# Patient Record
Sex: MALE | Race: WHITE | Employment: FULL TIME | URBAN - METROPOLITAN AREA
[De-identification: names, ages, dates, MRNs, and addresses within clinical notes are randomized per-mention and may not be internally consistent; named-entity substitution may affect disease eponyms.]

---

## 2017-03-03 NOTE — TELEPHONE ENCOUNTER
From: Fermin Guaman  To: Jair Valenzuela MD  Sent: 3/2/2017 10:52 PM EST  Subject: Medication Renewal Request    Original authorizing provider: MD Fermin Anderson would like a refill of the following medications:  zolpidem (AMBIEN) 10 mg tablet Jair Valenzuela MD]    Preferred pharmacy: [facility]/PHARMACY #[id number]- [facility] [address]    Comment:  Adriano Quarles, please send refill information to [address] [place] [address] for Zolpidem 10mg # 90 with 2 refills.  thanks [facility]

## 2017-03-05 RX ORDER — ZOLPIDEM TARTRATE 10 MG/1
TABLET ORAL
Qty: 30 TAB | Refills: 3 | Status: SHIPPED | OUTPATIENT
Start: 2017-03-05

## 2017-03-05 RX ORDER — ZOLPIDEM TARTRATE 10 MG/1
10 TABLET ORAL
Qty: 90 TAB | Refills: 2 | Status: SHIPPED | OUTPATIENT
Start: 2017-03-05 | End: 2018-08-13 | Stop reason: SDUPTHER

## 2017-03-06 RX ORDER — ZOLPIDEM TARTRATE 10 MG/1
10 TABLET ORAL
Qty: 90 TAB | Refills: 2 | Status: SHIPPED | OUTPATIENT
Start: 2017-04-20 | End: 2017-08-03 | Stop reason: SDUPTHER

## 2017-03-06 NOTE — TELEPHONE ENCOUNTER
From: Vinson Dandy  To: Toni Denver, MD  Sent: 3/5/2017 2:33 PM EST  Subject: Medication Renewal Request    Original authorizing provider: Toni Denver, MD Vinson Dandy would like a refill of the following medications:  zolpidem (AMBIEN) 10 mg tablet Toni Denver, MD]    Preferred pharmacy: [facility]/PHARMACY #[id number] [facility] [address]    Comment:  Jeni Clifton, please authorize refill for Ambien 10mg #90 2 refills.  thanks

## 2017-03-08 ENCOUNTER — HOSPITAL ENCOUNTER (OUTPATIENT)
Dept: PHYSICAL THERAPY | Age: 62
Discharge: HOME | End: 2017-03-08
Payer: COMMERCIAL

## 2017-03-08 PROCEDURE — 97012 MECHANICAL TRACTION THERAPY: CPT

## 2017-03-08 PROCEDURE — 97162 PT EVAL MOD COMPLEX 30 MIN: CPT

## 2017-03-08 NOTE — PROGRESS NOTES
PT CERVICAL EVAL AND TREATMENT     Patient Name: Philipp Mcdonald  Date:3/8/2017  : 1955  [x]  Patient  Verified  Payor: BLUE CROSS / Plan: Verito Jarrett [id number] PPO / Product Type: PPO /    In time:500  Out time:600  Total Treatment Time (min): 60   Visit #: 1 of 12    Treatment Area: Cervicalgia [M54.2]    Physical Therapy Evaluation Cervical Spine - [facility]  SUBJECTIVE  Chief Complaint: Pt is 63 y.o. male presenting with R shoulder pain with occasional numbness. Pt reports symptoms began 6 weeks ago. Pain ranges 2-9/10; better with medication. Pt reports no previous injury to the affected area in the past. 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 Pt reports numbness or tingling in the R hand into the fingers. Pt also reports currently having partial RTC tear in the R shoulder. Pt presents with the following functional limitations: decreased sleeping tolerance, decreased recreation activities such as golfing, decreased reaching ability. .       Mechanism of injury: stretching    Symptoms  Aggravated by:   [] Bending [] Sitting [] Standing [] Reaching Overhead   [] Moving [] Cough [] Sneeze [] Eating   [] AM  [] PM  Lying:  [] sup   [] pro   [] sidelying   [] Other:     Eased by:    [] Bending [] Sitting [] Standing Lying: [] sup  [] pro  [] sidelying   [] Moving [] AM  [] PM  [] Other:     General Health:  Red Flags Indicated? [] Yes    [x] No  [] Yes [] No Recent weight change (If yes, due to dieting?  [] Yes  [] No)   [] Yes [] No Persistent cough  [] Yes [] No Unremitting pain at night  [] Yes [] No Dizziness  [] Yes [] No Blurred vision  [] Yes [] No Hands more cold or painful in cold weather  [] Yes [] No Ringing in ears  [] Yes [] No Difficulty swallowing  [] Yes [] No Dysfunction of bowel or bladder  [] Yes [] No Recent illness within past 3 weeks (i.e, cold, flu)  [] Yes [] No Jaw pain      Diagnostic Tests: [] Lab work [] X-rays    [] CT [x] MRI     [] Other:  Results:     Functional Status  Prior level of function:  Present functional limitations:  What position do you sleep in?: supine      Headaches: Do you have headaches? [] Yes   [x] No  How often do you get headaches? How long does the headache last?  What aggravates it? What relieves it? Does the headache coincide with any other symptoms (visual disturbances, light sensitivity)? Where is the headache? Does it change locations?   Other:    OBJECTIVE  Posture: [] WNL  Head Position:  Shoulder/Scapular Position: FHRS posture  C-Kyphosis:  [] increased   [] decreased   C-Lordosis:   [] increased   [x] decreased  T-Kyphosis:  [] increased   [] decreased  T-Lordosis:   [] increased   [] decreased       Cervical Retraction: [] WNL    [x] Abnormal:    Shoulder/Scapular Screen: [] WNL    [] Abnormal:    Active Movements: [] N/A   [] Too acute   [] Other:  ROM % AROM % PROM Comments:pain, area   Forward flexion 30 p     Extension 40     SB right 30 p     SB left  40     Rotation right 45     Rotation left 45       Thoracic Spine: [] N/A    [] WNL   [] Other:      Palpation:  [] Min  [x] Mod  [] Severe    Location: TTP R UT, R C/S paraspinals  [] Min  [] Mod  [] Severe    Location:  [] Min  [] Mod  [] Severe    Location:    Neuro Screen (myotome/dematome/felexes): [x] WNL  Myotome Level Muscle Test Myotome Level Muscle Test   C5 Shoulder Adduction - Deltoid C8 Finger Flexors   C6 Wrist Extension T1 Finger Abduction - Interossei   C7 Elbow Extension     Comments:  Upper Limb Tension Tests: [] N/A       Ulnar: [] R    [] L    [] +    [x] -       Median: [] R    [] L    [] +    [x] -       Radial: [] R    [] L    [] +    [x] -    Special Tests:  Cervical:        Vertebral Artery:  [] R    [] L    [] +    [] -       Alar Ligament: [] R    [] L    [] +    [] -       Transverse Lig: [] R    [] L    [] +    [] -       Spurling's:  [x] R    [] L    [x] +    [] -       Distraction:  [x] R    [] L    [x] +    [] -       Compression: [] R    [] L    [] +    [] -    Thoracic Outlet Tests: [] N/A       Adson's:  [] R    [] L    [] +    [] -       Hyperabduction: [] R    [] L    [] +    [] -       Scott's:  [] R    [] L    [] +    [] -       Parisa:  [] R    [] L    [] +    [] -    Diaphragmatic Breathing: [] Normal    [] Abnormal    Muscle Flexibility: [] N/A   Scalenes: [] WNL    [x] Tight    [x] R    [] L   Upper Trap: [] WNL    [x] Tight    [x] R    [] L   Levator: [] WNL    [x] Tight    [x] R    [] L   Pect. Minor: [] WNL    [x] Tight    [x] R    [] L    Global Muscular Weakness: [] N/A   Lower Trap:   Rhomboids:   Middle Trap:   Serratus Ant:   Ext Rotators: Other:    Other tests/comments:  Modality (rationale): decrease radicular symptoms in the R UE  []  E-Stim: type _ x _ min     []att   []unatt   []w/US   []w/ice   []w/heat  [x]  Traction: [x]cerv   []pelvic   12 lbs x 10 min     []pro   [x]sup   [x]int   []const  []  Ultrasound: []cont   []pulse    _ W/cm2 x _  min   []1MHz   []3MHz  []  Iontophoresis: []take home patch w/ dexamethazone    []40mA   []80mA                               []_ mA min w/: []dexamethazone   []other:_  []  Ice pack _  min     [] Hot pack _  min     [] Paraffin _  min  []  Other:    Patient Education: [x] Established HEP    [] POT  (minutes) : 15    Pain Level (0-10 scale) post treatment: 2    ASSESSMENT  [x]  See Plan of Care    Evaluation Code Complexity: History MEDIUM  Complexity : 1-2 comorbidities / personal factors will impact the outcome/ POC ; Examination HIGH Complexity : 4+ Standardized tests and measures addressing body structure, function, activity limitation and / or participation in recreation ; Presentation MEDIUM Complexity : Evolving with changing characteristics ; Decision Making MEDIUM Complexity : FOTO score of 26-74;  Complexity MEDIUM    Justification for Eval Code Complexity:  Patient History : tobacco use  Examination SEE ABOVE EXAM  Clinical Presentation: evolving pain  Clinical Decision Making : UWIK 32      SEXS  [x]  Upgrade activities as tolerated     []  Continue plan of care  []  Discharge due to:_  [x] Other:_POC  2-3x/week for 4 weeks    Gilberto Gonzales, PT 3/8/2017  5:03 PM

## 2017-03-08 NOTE — PROGRESS NOTES
[address] [facility] AT [address], [address] [place] [address] Road  Phone: [phone]   [phone]  PLAN OF CARE / [address] [place] [address] PHYSICAL THERAPY SERVICES  Patient Name: Anju Case : 1955   Medical   Diagnosis: Cervicalgia [M54.2] Treatment Diagnosis: Cervicalgia [M54.2]   Onset Date: 6 weeks ago     Referral Source: Yaz Villa MD Start of [facility]): 3/8/2017   Prior Hospitalization: See medical history Provider #: [id number]   Prior Level of Function: Independent and pain free with ADL's   Comorbidities: None Listed   Medications: Verified on Patient Summary List   The Plan of Care and following information is based on the information from the initial evaluation.   ===========================================================================================  Assessment / key information:  Pt is 63 y.o. male presenting with R shoulder pain with occasional numbness. Pt reports symptoms began 6 weeks ago. Pain ranges 2-9/10; better with medication. Pt reports no previous injury to the affected area in the past. 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 Pt reports numbness or tingling in the R hand into the fingers. Pt also reports currently having partial RTC tear in the R shoulder. Pt presents with the following functional limitations: decreased sleeping tolerance, decreased recreation activities such as golfing, decreased reaching ability. Pt presented with FHRS posture in sitting. TTP over the R UT, C/S paraspinals. Decreased C/S AROM with pain. Decreased gross UE strength 4-/5 in all directions. Symmetrical  strength at this time. + Spurlings, + Distraction test. Sensation intact to light touch BL UE's. Neural tension tests negative at this time. Moderate tension noted in the R scalenes, UT, levator, and pec minor.  The patient was instructed in a home exercise program to address the above findings/deficits. Pt will benefit from PT interventions to address the aforementioned deficits and allow pt to return to PLOF.    ===========================================================================================  History MEDIUM  Complexity : 1-2 comorbidities / personal factors will impact the outcome/ POC ; Examination HIGH Complexity : 4+ Standardized tests and measures addressing body structure, function, activity limitation and / or participation in recreation ; Presentation MEDIUM Complexity : Evolving with changing characteristics ; Decision Making MEDIUM Complexity : FOTO score of 26-74; Complexity MEDIUM  Problem List: pain affecting function, decrease ROM, decrease strength, decrease ADL/ functional abilitiies, decrease activity tolerance, decrease flexibility/ joint mobility and decrease transfer abilities   Treatment Plan may include any combination of the following: Therapeutic exercise, Therapeutic activities, Neuromuscular re-education, Physical agent/modality, Manual therapy, Patient education and Functional mobility training  Patient / Family readiness to learn indicated by: asking questions, trying to perform skills and interest  Persons(s) to be included in education: patient (P)  Barriers to Learning/Limitations: None  Measures taken:    Patient Goal (s): Get rid of the pain   Patient self reported health status: excellent  Rehabilitation Potential: good   Short Term Goals: To be accomplished in  1-2  weeks:  1. Pt to demonstrate independence with HEP to improve c/s AROM and posture for ADLs. 2. Pt to demonstrate improved posture >50% of time in PT to improve axial stability for ADLs.  Long Term Goals: To be accomplished in  8-12  treatments:  1. Pt to report +5 or > on GROC to improve functional mobility for ADLs. Improve FOTO outcome score by 10-15% in order to indicate a significant improvement in ADL function.   3. Pt to demonstrate WNL C/S AROM to improve mobility for ADLs. 4. Pt will report 75% overall improvement with sleeping tolerance in order to progress back to PLOF. Frequency / Duration:   Patient to be seen  2-3  times per week for 8-12  treatments:  Patient / Caregiver education and instruction: exercises  G[employer]Papi (GP): PIERRE  Therapist Signature: Keaton Gonzales, PT Date: 3/0/0719   Certification Period: NA Time: 5:39 PM   ===========================================================================================  I certify that the above Physical Therapy Services are being furnished while the patient is under my care. I agree with the treatment plan and certify that this therapy is necessary. Physician Signature:        Date:       Time:     Please sign and return to [facility] [place] [facility] or you may fax the signed copy to [phone]. Thank you.

## 2017-03-09 ENCOUNTER — HOSPITAL ENCOUNTER (OUTPATIENT)
Dept: PHYSICAL THERAPY | Age: 62
Discharge: HOME | End: 2017-03-09
Payer: COMMERCIAL

## 2017-03-09 PROCEDURE — 97012 MECHANICAL TRACTION THERAPY: CPT

## 2017-03-09 PROCEDURE — 97110 THERAPEUTIC EXERCISES: CPT

## 2017-03-09 NOTE — PROGRESS NOTES
PT DAILY TREATMENT NOTE 8    Patient Name: Virgie Space  Date:3/9/2017  : 1955  [x]  Patient  Verified  Payor: BLUE CROSS / Plan: Verito Jarrett [id number] PPO / Product Type: PPO /    In time:1000  Out time:1100  Total Treatment Time (min): 60  Visit #: 2 of 12    Treatment Area: Cervicalgia [M54.2]    SUBJECTIVE  Pain Level (0-10 scale): 3  Any medication changes, allergies to medications, adverse drug reactions, diagnosis change, or new procedure performed?: [x] No    [] Yes (see summary sheet for update)  Subjective functional status/changes:   [] No changes reported  Pt reports mild soreness since his IE yestersay.     OBJECTIVE  Modality rationale: decrease pain and increase tissue extensibility to improve the patients ability to perform functional ADL's   Min Type Additional Details    [] Estim: []Att   []Unatt        []TENS instruct                  []IFC  []Premod   []NMES                     []Other:  []w/US   []w/ice   []w/heat  Position:  Location:   15 [x]  Traction: [x] Cervical       []Lumbar                       [] Prone          []Supine                       [x]Intermittent   []Continuous Lbs: 12/5  [] before manual  [] after manual    []  Ultrasound: []Continuous   [] Pulsed                           []1MHz   []3MHz Location:  W/cm2:    []  Iontophoresis with dexamethasone         Location: [] Take home patch   [] In clinic   10 []  Ice     [x]  heat  []  Ice massage Position:semreclined  Location: C/S    []  Vasopneumatic Device Pressure:       [] lo [] med [] hi   Temperature: [] lo [] med [] hi   [x] Skin assessment post-treatment:  [x]intact []redness- no adverse reaction       []redness  adverse reaction:       35 min Therapeutic Exercise:  [x] See flow sheet :   Rationale: increase ROM and increase strength to improve the patients ability to perform functional ADL's            X min Patient Education: [x] Review HEP    [] Progressed/Changed HEP based on:   [] positioning [] body mechanics   [] transfers   [] heat/ice application        Other Objective/Functional Measures: Initiated therex today per flow sheet, requiring vc and demo 100% of the time for proper form and technique. Pain Level (0-10 scale) post treatment: 1    ASSESSMENT/Changes in Function: Pt was able to tolerate initiation of therex with moderate reports of pain and soreness. Moderate soreness and increased tingling noted with cervicothoracic stabs as well as tband rows and pulldowns. Will continues to address postural abnormalities as well as pursue home traction unit for the patient. Patient will continue to benefit from skilled PT services to modify and progress therapeutic interventions, address functional mobility deficits, address ROM deficits, address strength deficits, analyze and address soft tissue restrictions, analyze and cue movement patterns, analyze and modify body mechanics/ergonomics and assess and modify postural abnormalities to attain remaining goals. [x]  See Plan of Care  []  See progress note/recertification  []  See Discharge Summary         Progress towards goals / Updated goals:  All goals reviewed and progressing appropriately as of 3/9/2017     PLAN  [x]  Upgrade activities as tolerated     [x]  Continue plan of care  []  Update interventions per flow sheet       []  Discharge due to:_  []  Other:_      Pradeep Gonzales PT 3/9/2017  9:04 AM    Future Appointments  Date Time Provider Damaris Wolfe   3/9/2017 10:00 AM Pradeep Gonzales PT [facility]

## 2017-03-17 ENCOUNTER — HOSPITAL ENCOUNTER (OUTPATIENT)
Dept: PHYSICAL THERAPY | Age: 62
Discharge: HOME | End: 2017-03-17
Payer: COMMERCIAL

## 2017-03-17 PROCEDURE — 97012 MECHANICAL TRACTION THERAPY: CPT

## 2017-03-17 PROCEDURE — 97140 MANUAL THERAPY 1/> REGIONS: CPT

## 2017-03-17 PROCEDURE — 97110 THERAPEUTIC EXERCISES: CPT

## 2017-03-17 NOTE — PROGRESS NOTES
PT DAILY TREATMENT NOTE     Patient Name: Connie Rolle  Date:3/17/2017  : 1955  [x]  Patient  Verified  Payor: BLUE CROSS / Plan: [place] [facility] [employer] PPO / Product Type: PPO /    In time:800  Out time:845  Total Treatment Time (min): 45  Visit #: 3 of 12    Treatment Area: Cervicalgia [M54.2]    SUBJECTIVE  Pain Level (0-10 scale): 0  Any medication changes, allergies to medications, adverse drug reactions, diagnosis change, or new procedure performed?: [x] No    [] Yes (see summary sheet for update)  Subjective functional status/changes:   [] No changes reported  \"I was not able to do my exercises much while I was out of town, and I felt it more\"    OBJECTIVE  Modality rationale: decrease pain and increase tissue extensibility to improve the patients ability to perform functional ADL's   Min Type Additional Details    [] Estim: []Att   []Unatt        []TENS instruct                  []IFC  []Premod   []NMES                     []Other:  []w/US   []w/ice   []w/heat  Position:  Location:   15 [x]  Traction: [x] Cervical       []Lumbar                       [] Prone          []Supine                       [x]Intermittent   []Continuous Lbs: 12/5  [] before manual  [] after manual    []  Ultrasound: []Continuous   [] Pulsed                           []1MHz   []3MHz Location:  W/cm2:    []  Iontophoresis with dexamethasone         Location: [] Take home patch   [] In clinic   10 []  Ice     [x]  heat  []  Ice massage Position:semreclined  Location: C/S    []  Vasopneumatic Device Pressure:       [] lo [] med [] hi   Temperature: [] lo [] med [] hi   [x] Skin assessment post-treatment:  [x]intact []redness- no adverse reaction       []redness  adverse reaction:       12 min Therapeutic Exercise:  [x] See flow sheet :   Rationale: increase ROM and increase strength to improve the patients ability to perform functional ADL's      8 min Manual Therapy:  DTM to C/S paraspinals and UT.     Rationale: increase ROM  to improve the patients ability to perform functional ADL's and improve C/S mobility            X min Patient Education: [x] Review HEP    [] Progressed/Changed HEP based on:   [] positioning   [] body mechanics   [] transfers   [] heat/ice application        Other Objective/Functional Measures:    Pain Level (0-10 scale) post treatment: 2    ASSESSMENT/Changes in Function: Pt had moderate difficulty with any UE movements today due to RTC pain. Pt continues to have the most benefit from the traction with radicular symptoms decreasing following. Will continue to progress therex within current POC as patient is able. Will continue to attempt MD contact about receiving home traction request.     Patient will continue to benefit from skilled PT services to modify and progress therapeutic interventions, address functional mobility deficits, address ROM deficits, address strength deficits, analyze and address soft tissue restrictions, analyze and cue movement patterns, analyze and modify body mechanics/ergonomics and assess and modify postural abnormalities to attain remaining goals. [x]  See Plan of Care  []  See progress note/recertification  []  See Discharge Summary         Progress towards goals / Updated goals:  All goals reviewed and progressing appropriately as of 3/17/2017     PLAN  [x]  Upgrade activities as tolerated     [x]  Continue plan of care  []  Update interventions per flow sheet       []  Discharge due to:_  []  Other:_      Christina Gonzales PT 3/17/2017  9:04 AM    Future Appointments  Date Time Provider Damaris Wolfe   3/17/2017 8:00 AM Christina Gonzales PT [facility]

## 2017-03-24 ENCOUNTER — HOSPITAL ENCOUNTER (OUTPATIENT)
Dept: PHYSICAL THERAPY | Age: 62
Discharge: HOME | End: 2017-03-24
Payer: COMMERCIAL

## 2017-03-24 PROCEDURE — 97012 MECHANICAL TRACTION THERAPY: CPT

## 2017-03-24 PROCEDURE — 97110 THERAPEUTIC EXERCISES: CPT

## 2017-03-24 NOTE — PROGRESS NOTES
PT DAILY TREATMENT NOTE     Patient Name: Damian Landon  Date:3/24/2017  : 1955  [x]  Patient  Verified  Payor: BLUE CROSS / Plan: [facility] PPO / Product Type: PPO /    In time:8:04  Out time:8:48  Total Treatment Time (min): 44  Total Timed Codes (min): 44  1:1 Treatment Time (min):    Visit #: 4 of 12    Treatment Area: Cervicalgia [M54.2]    SUBJECTIVE  Pain Level (0-10 scale): 0  Any medication changes, allergies to medications, adverse drug reactions, diagnosis change, or new procedure performed?: [x] No    [] Yes (see summary sheet for update)  Subjective functional status/changes:   [] No changes reported  Pt reports no pain this morning, good adherence to his home exercises and overall reduction in intensity/frequency of arm numbness. He still does get the symptoms, but the traction also seems to help.     OBJECTIVE  Modality rationale: increase tissue extensibility to improve the patients ability to change and maintain head and shoulder positions     Min Type Additional Details    [] Estim: []Att   []Unatt        []TENS instruct                  []IFC  []Premod   []NMES                     []Other:  []w/US   []w/ice   []w/heat  Position:  Location:   15 [x]  Traction: [x] Cervical       []Lumbar                       [] Prone          [x]Supine                       [x]Intermittent   []Continuous Lbs:20/14  [] before manual  [] after manual    []  Ultrasound: []Continuous   [] Pulsed                           []1MHz   []3MHz Location:  W/cm2:    []  Iontophoresis with dexamethasone         Location: [] Take home patch   [] In clinic    []  Ice     []  heat  []  Ice massage Position:  Location:    []  Vasopneumatic Device Pressure:       [] lo [] med [] hi   Temperature: [] lo [] med [] hi   [] Skin assessment post-treatment:  []intact []redness- no adverse reaction       []redness  adverse reaction:       29 min Therapeutic Exercise:  [] See flow sheet :   Rationale: increase ROM and increase strength to improve the patients ability to change and maintain head and shoulder positions  Included neural tension assessment, foam roll and 1/2 foam roll trial stretch    Pain Level (0-10 scale) post treatment: 0    ASSESSMENT/Changes in Function: Pt also assessed and found to have increased/adverse right UE neural tension compared to left, including tingling. Pt was given 2 options of corkscrew stretch or foam roll postural stretch to be done to tolerance to address this issue in addition to existing cervical HEP. L cervical AROM still limited    Patient will continue to benefit from skilled PT services to modify and progress therapeutic interventions, address functional mobility deficits and analyze and cue movement patterns to attain remaining goals. []  See Plan of Care  []  See progress note/recertification  []  See Discharge Summary       LTG 3: Pt to demonstrate WNL C/S AROM to improve mobility for ADLs. - Not met with flexion, L rotation    PLAN  []  Upgrade activities as tolerated     [x]  Continue plan of care  []  Update interventions per flow sheet       []  Discharge due to:_  []  Other:_      Francis Carrizales, PT 3/24/2017  7:59 AM

## 2017-03-31 ENCOUNTER — HOSPITAL ENCOUNTER (OUTPATIENT)
Dept: PHYSICAL THERAPY | Age: 62
Discharge: HOME | End: 2017-03-31
Payer: COMMERCIAL

## 2017-03-31 PROCEDURE — 97110 THERAPEUTIC EXERCISES: CPT

## 2017-03-31 NOTE — PROGRESS NOTES
PT DAILY TREATMENT NOTE     Patient Name: Luh Torres  Date:3/31/2017  : 1955  [x]  Patient  Verified  Payor: BLUE CROSS / Plan: [facility] PPO / Product Type: PPO /    In time:800  Out time:828  Total Treatment Time (min): 28  Visit #: 5 of 12    Treatment Area: Cervicalgia [M54.2]    SUBJECTIVE  Pain Level (0-10 scale): 0  Any medication changes, allergies to medications, adverse drug reactions, diagnosis change, or new procedure performed?: [x] No    [] Yes (see summary sheet for update)  Subjective functional status/changes:   [] No changes reported  SEE DC SUMMARY    OBJECTIVE  Modality rationale: increase tissue extensibility to improve the patients ability to change and maintain head and shoulder positions     Min Type Additional Details    [] Estim: []Att   []Unatt        []TENS instruct                  []IFC  []Premod   []NMES                     []Other:  []w/US   []w/ice   []w/heat  Position:  Location:   ND [x]  Traction: [x] Cervical       []Lumbar                       [] Prone          [x]Supine                       [x]Intermittent   []Continuous Lbs:20/14  [] before manual  [] after manual    []  Ultrasound: []Continuous   [] Pulsed                           []1MHz   []3MHz Location:  W/cm2:    []  Iontophoresis with dexamethasone         Location: [] Take home patch   [] In clinic    []  Ice     []  heat  []  Ice massage Position:  Location:    []  Vasopneumatic Device Pressure:       [] lo [] med [] hi   Temperature: [] lo [] med [] hi   [] Skin assessment post-treatment:  []intact []redness- no adverse reaction       []redness  adverse reaction:       28 min Manual Therapy:  Reassessment for PN, demonstration and education on traction unit from rep   Rationale: prepare patient from DC to long term HEP    Pain Level (0-10 scale) post treatment: 0    ASSESSMENT/Changes in Function:    []  See Plan of Care  []  See progress note/recertification  [x]  See Discharge Summary        PLAN  []  Upgrade activities as tolerated     []  Continue plan of care  []  Update interventions per flow sheet       [x]  Discharge due to: pt will DC to long term HEP and use of cervical traction unit  []  Other:_      Ita Gonzales PT 3/31/2017  7:59 AM

## 2017-03-31 NOTE — PROGRESS NOTES
[facility] AT [address], [address] [place] [address]  Phone: [phone]   [phone]  DISCHARGE SUMMARY  Patient Name: Jorge Norton :    Treatment/Medical Diagnosis: Cervicalgia [M54.2]   Referral Source: Nancy Maldonado MD     Date of Initial Visit: 3/8/17 Attended Visits: 5 Missed Visits: 0     SUMMARY OF TREATMENT  Pt was seen on 3/8/17 for an initial evaluation for Cervicalgia. Pt has been seen for 5 visits and therapy has included: therapeutic exercise, manual therapy, modalities for pain control, patient education and HEP. CURRENT STATUS  Pt has been seen for 5 visits since IE on 3/8/17. Pt reports 75% overall improvement since beginning therapy. Pt has received home traction unit as well as long term HEP and wished to continue with therapy at home at this time. Pt has improved all C/S AROM at this time with moderate improvements in pain. Pt has received a home traction unit and will continue to use as needed at home. 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 Pt has progressed well and will be DC to long term HEP at this time. C/S AROM: 45 flexion, 50 ext, 65 Rot, 45 SB    Short Term Goals: To be accomplished in 1-2 weeks:  1. Pt to demonstrate independence with HEP to improve c/s AROM and posture for ADLs. - goal met  2. Pt to demonstrate improved posture >50% of time in PT to improve axial stability for ADLs. - goal met     Long Term Goals: To be accomplished in 8-12 treatments:  1. Pt to report +5 or > on GROC to improve functional mobility for ADLs- NA  2. Improve FOTO outcome score by 10-15% in order to indicate a significant improvement in ADL function. - 66%, goal met  3. Pt to demonstrate WNL C/S AROM to improve mobility for ADLs- goal met  4.  Pt will report 75% overall improvement with sleeping tolerance in order to progress back to PLOF- goal met      RECOMMENDATIONS  Other: Patient will continue at home with HEP and use of home traction unit. If you have any questions/comments please contact us directly at [phone]. Thank you for allowing us to assist in the care of your patient.     Therapist Signature: Heber Anne PT Date: 3/31/2017   Reporting Period: NA Time: 6:59 AM

## 2017-06-12 NOTE — TELEPHONE ENCOUNTER
From: Booker Mcclain  To: Chuy Wesley MD  Sent: 6/11/2017 11:30 AM EDT  Subject: Medication Renewal Request    Original authorizing provider: MD Booker Machado would like a refill of the following medications:  tamsulosin (FLOMAX) 0.4 mg capsule Chuy Wesley MD]    Preferred pharmacy: [facility]PHARMACY #[id number]- [place] [address]    Comment:

## 2017-06-13 RX ORDER — TAMSULOSIN HYDROCHLORIDE 0.4 MG/1
0.4 CAPSULE ORAL DAILY
Qty: 90 CAP | Refills: 3 | Status: SHIPPED | OUTPATIENT
Start: 2017-06-13 | End: 2018-05-29 | Stop reason: SDUPTHER

## 2017-08-07 RX ORDER — ZOLPIDEM TARTRATE 10 MG/1
10 TABLET ORAL
Qty: 90 TAB | Refills: 2 | Status: SHIPPED | OUTPATIENT
Start: 2017-08-29

## 2017-08-07 NOTE — TELEPHONE ENCOUNTER
From: Gilberto Mayers  To: Love Cason MD  Sent: 8/3/2017 3:52 PM EDT  Subject: Medication Renewal Request    Original authorizing provider: MD Gilberto Aggarwal would like a refill of the following medications:  zolpidem (AMBIEN) 10 mg tablet Love Cason MD]    Preferred pharmacy: Other - [facility] in [place] [phone]    Comment:  Please authorize Zolpidem 10mg #90 with to refills.  [facility] in [place] [phone]

## 2017-08-09 RX ORDER — ZOLPIDEM TARTRATE 10 MG/1
TABLET ORAL
Qty: 90 TAB | Refills: 0 | Status: SHIPPED | OUTPATIENT
Start: 2017-08-09 | End: 2018-03-31 | Stop reason: SDUPTHER

## 2018-03-26 ENCOUNTER — HOSPITAL ENCOUNTER (OUTPATIENT)
Dept: PHYSICAL THERAPY | Age: 63
Discharge: HOME | End: 2018-03-26
Payer: COMMERCIAL

## 2018-03-26 PROCEDURE — 97162 PT EVAL MOD COMPLEX 30 MIN: CPT

## 2018-03-26 PROCEDURE — 97012 MECHANICAL TRACTION THERAPY: CPT

## 2018-03-26 PROCEDURE — 97110 THERAPEUTIC EXERCISES: CPT

## 2018-03-26 NOTE — PROGRESS NOTES
PT DAILY TREATMENT     Patient Name: Severa Anes  Date:3/26/2018  : 1955  [x]  Patient  Verified  Payor: Juan Pablo Anchors / Plan: [facility] [employer] PPO / Product Type: PPO /    In time:9:00  Out time:9:46  Total Treatment Time (min): 46  Total Timed Codes (min): 46  1:1 Treatment Time ( only):    Visit #: 1 of 12    Treatment Area: Neck pain [M54.2]  Cervical radiculopathy [M54.12]    SUBJECTIVE  Pain Level (0-10 scale): 2/10  Physical Therapy Evaluation Cervical Spine -     SUBJECTIVE  Chief Complaint:    Mechanism of injury:    Symptoms  Aggravated by:   [] Bending [] Sitting [] Standing [] Reaching Overhead   [] Moving [] Cough [] Sneeze [] Eating   [] AM  [] PM  Lying:  [] sup   [] pro   [] sidelying   [] Other:     Eased by:    [] Bending [] Sitting [] Standing Lying: [] sup  [] pro  [] sidelying   [] Moving [] AM  [] PM  [] Other:     General Health:  Red Flags Indicated?  [] Yes    [] No  Past History/Treatments:    Diagnostic Tests: [] Lab work [] X-rays    [] CT [] MRI     [] Other:  Results:    Functional Status  Prior level of function:    OBJECTIVE  Posture: [] WNL      Active Movements: [] N/A   [] Too acute   [] Other:  ROM  AROM PROM Comments:pain, area   Forward flexion      Extension      SB right      SB left       Rotation right      Rotation left        Palpation:  [] Min  [] Mod  [] Severe    Location:  [] Min  [] Mod  [] Severe    Location:  [] Min  [] Mod  [] Severe    Location:    Neuro Screen (myotome/dematome/felexes): [] WNL  Myotome Level Muscle Test Myotome Level Muscle Test   C5 Shoulder Adduction - Deltoid C8 Finger Flexors   C6 Wrist Extension T1 Finger Abduction - Interossei   C7 Elbow Extension     Comments:    Upper Limb Tension Tests: [] N/A      Special Tests:  Cervical:        Vertebral Artery:  [] R    [] L    [] +    [] -       Alar Ligament: [] R    [] L    [] +    [] -       Transverse Lig: [] R    [] L    [] +    [] -       Spurling's:  [] R [] L    [] +    [] -       Distraction:  [] R    [] L    [] +    [] -       Compression: [] R    [] L    [] +    [] -    Thoracic Outlet Tests: [] N/A       Adson's:  [] R    [] L    [] +    [] -       Hyperabduction: [] R    [] L    [] +    [] -       Scott's:  [] R    [] L    [] +    [] -       Parisa:  [] R    [] L    [] +    [] -    Other tests/comments:  OBJECTIVE  Modality (rationale):   []  E-Stim: type _ x _ min     []att   []unatt   []w/US   []w/ice   []w/heat  [x]  Traction: [x]cerv   []pelvic   39 lbs x 10_ min     []pro   [x]sup   []int   [x]const  []  Ultrasound: []cont   []pulse    _ W/cm2 x _  min   []1MHz   []3MHz  []  Iontophoresis: []take home patch w/ dexamethazone    []40mA   []80mA                               []_ mA min w/: []dexamethazone   []other:_  []  Ice pack _  min     [] Hot pack _  min     [] Paraffin _  min  []  Other:     Therapeutic Exercise: [x] see flow sheet     [] Other:_ 10 (minutes) :    Patient Education: [x] Review HEP    [] Progressed/Changed HEP based on:_   [] positioning   [] body mechanics   [] transfers   [] heat/ice application   (minutes) :    Pain Level (0-10 scale) post treatment: 0/10 with no numbness in hand    ASSESSMENT  [x]  See Plan of Care    PLAN  See Plan of Care        Cristino Gruber, JOE 3/26/2018  9:44 AM

## 2018-03-26 NOTE — PROGRESS NOTES
[facility] AT [address], [address] [place] [address] Road  Phone: [phone]   [phone]  PLAN OF CARE / [address] [place] [address] PHYSICAL THERAPY SERVICES  Patient Name: Mahesh Alamo :    Medical   Diagnosis: Neck pain [M54.2]  Cervical radiculopathy [M54.12] Treatment Diagnosis: Neck pain [M54.2]  Cervical radiculopathy [M54.12]   Onset Date: 2018     Referral Source: Jarvis Flores MD [facility]): 3/26/2018   Prior Hospitalization: See medical history Provider #: [id number]   Prior Level of Function: Fully independent   Comorbidities: None   Medications: Verified on Patient Summary List   The Plan of Care and following information is based on the information from the initial evaluation.   ===========================================================================================  Assessment / key information:  Patient is a 63year old male with chronic cervical pain and radicular numbness into right arm. Patient reports history of C5/6 and C6/7 with mild disc herniation that was verified by MRI and treated with therapy here and resolved 1 year ago . Pain returned about 1 month ago with utilizing a mid to low back stretching machine likely causing the return of symptoms. Patient reports seeing a chiropractor that has adjusted his spine and reduced the constant pain to be intermittent and recommended physical therapy. Patient reports playing basketball most of his life. Pain is located to posterior right scapula with numbness to medial right arm; described as dull. Current: 2/10; Worse 6/10 - aggravated sleeping, postural alterations; Best: 0/10 - eased with improved positioning. Patient is fully independent in all activities. Patient reports he has not been able to play golf and has stopped using his elliptical machine due to numbness.   Patient was unable to tolerate lying supine due to increase in pain to back and numbness to right UE. Cervical motion: Flexion 40 degrees, Extension 40 degrees with pain to medial scapula and increase in numbness in right hand; Right side bend 40 degrees with a increase in numbness, Left side bend 35 degrees with a decrease in numbness; Left rotation 60 degrees; Right rotation 70 degrees. Positive cervical compression and distraction has patient will benefit from traction. Patient with slight forward head. 5/5 strength cervical and shoulder. Patient signs and symptoms are consistent with Ulnar nerve entrapment located at cervical spine.      ===========================================================================================  History MEDIUM  Complexity : 1-2 comorbidities / personal factors will impact the outcome/ POC ; Examination LOW Complexity : 1-2 Standardized tests and measures addressing body structure, function, activity limitation and / or participation in recreation ; Presentation LOW Complexity : Stable, uncomplicated ; Decision Making MEDIUM Complexity : FOTO score of 26-74; Complexity MEDIUM  Problem List: pain affecting function, decrease ROM, decrease ADL/ functional abilitiies, decrease activity tolerance and decrease flexibility/ joint mobility   Treatment Plan may include any combination of the following: Therapeutic exercise, Physical agent/modality, Manual therapy, Self Care training and Functional mobility training  Patient / Family readiness to learn indicated by: asking questions, trying to perform skills and interest  Persons(s) to be included in education: patient (P)  Barriers to Learning/Limitations: None  Measures taken:    Patient Goal (s): Decrease numbness in order to play golf   Patient self reported health status: excellent  Rehabilitation Potential: excellent    · Short Term Goals: To be accomplished in  6  treatments:   Independent/compliant with long term HEP for cervical posture and mobility  Patient will be educated on sitting posture modification to reduce musculoskeletal strain with sitting ADL's  · Long Term Goals: To be accomplished in  12  treatments:  Improve FOTO outcome score by 9% to indicate a significant improvement in ADL function  Pt will report at least 50% improvement with daily neck movements, including at faster speeds      Frequency / Duration:   Patient to be seen  2  times per week for 4  weeks:  Patient / Caregiver education and instruction: self care, activity modification and exercises    Therapist Signature: Tiny Hung PT Date: 2/36/[birth year]57   Certification Period: 3/26/2018 to 6/25/2018 Time: 8:58 AM   ===========================================================================================  I certify that the above Physical Therapy Services are being furnished while the patient is under my care. I agree with the treatment plan and certify that this therapy is necessary. Physician Signature:        Date:       Time:     Please sign and return to [facility] or you may fax the signed copy to [phone]. Thank you.

## 2018-03-31 DIAGNOSIS — F51.01 PRIMARY INSOMNIA: Primary | ICD-10-CM

## 2018-04-02 ENCOUNTER — HOSPITAL ENCOUNTER (OUTPATIENT)
Dept: PHYSICAL THERAPY | Age: 63
Discharge: HOME | End: 2018-04-02
Payer: COMMERCIAL

## 2018-04-02 PROCEDURE — 97012 MECHANICAL TRACTION THERAPY: CPT

## 2018-04-02 PROCEDURE — 97110 THERAPEUTIC EXERCISES: CPT

## 2018-04-02 RX ORDER — ZOLPIDEM TARTRATE 10 MG/1
10 TABLET ORAL DAILY
Qty: 90 TAB | Refills: 0 | Status: SHIPPED | OUTPATIENT
Start: 2018-04-02 | End: 2018-06-09 | Stop reason: SDUPTHER

## 2018-04-02 NOTE — PROGRESS NOTES
PT DAILY TREATMENT NOTE     Patient Name: Pavithra Mccallum  Date:2018  : 1955  [x]  Patient  Verified  Payor: Goldie Mcneil / Plan: [address] PPO / Product Type: PPO /    In time:730  Out time: 814  Total Treatment Time (min): 44  Total Timed Codes (min): 44  1:1 Treatment Time (min): n/a   Visit #: 2 of 12    Treatment Area: Neck pain [M54.2]  Cervical radiculopathy [M54.12]    SUBJECTIVE  Pain Level (0-10 scale): 5 (down R UE to forearm)  Any medication changes, allergies to medications, adverse drug reactions, diagnosis change, or new procedure performed?: [x] No    [] Yes (see summary sheet for update)  Subjective functional status/changes:   [] No changes reported  \"It felt better after the traction last time, but the pain comes back when I lie down to go to sleep. I have a lot of different pillows I've tried\"    OBJECTIVE  Modality rationale: decrease pain to improve the patients ability to perform functional mobility/ADLs and attain goals.    Min Type Additional Details    [] Estim: []Att   []Unatt        []TENS instruct                  []IFC  []Premod   []NMES                     []Other:  []w/US   []w/ice   []w/heat  Position:  Location:   15 [x]  Traction: [x] Cervical       []Lumbar                       [] Prone          [x]Supine                       [x]Intermittent   []Continuous Lbs: 39/15#, 10/60\"  [] before manual  [] after manual    []  Ultrasound: []Continuous   [] Pulsed                           []1MHz   []3MHz Location:  W/cm2:    []  Iontophoresis with dexamethasone         Location: [] Take home patch   [] In clinic    []  Ice     []  heat  []  Ice massage Position:  Location:    []  Vasopneumatic Device Pressure:       [] lo [] med [] hi   Temperature: [] lo [] med [] hi   [] Skin assessment post-treatment:  []intact []redness- no adverse reaction       []redness  adverse reaction:       29 min Therapeutic Exercise:  [x] See flow sheet : Rationale: increase ROM and increase strength to improve the patients ability to perform functional mobility/ADLs and attain goals. min Patient Education: [x] Review HEP    [] Progressed/Changed HEP based on:   [] positioning   [] body mechanics   [] transfers   [] heat/ice application        Other Objective/Functional Measures:   -new exercises added as per flow sheet with vc's provided for form/technique 100% of the time. Pain Level (0-10 scale) post treatment: 4    ASSESSMENT/Changes in Function: Pt requires moderate cues and biofeedback (palpation of SCM) to avoid compensation of SCM with supine chin tuck; able to perform to ~25% without SCM; pt ed to continue with HEP at 25% and progress as tolerated without use of SCM. Unable to perform deep c/s neck flexion due to SCM compensation. Pt reported peripheralization with isometric c/s ext exercise, UT stretching, c/s AROM ROT; d/c'd. Postural stability of t/s without adverse signs/sx's. Patient will continue to benefit from skilled PT services to modify and progress therapeutic interventions, address ROM deficits, address strength deficits, analyze and address soft tissue restrictions and analyze and cue movement patterns to attain remaining goals. []  See Plan of Care  []  See progress note/recertification  []  See Discharge Summary         Progress towards goals / Updated goals:  Initiated exercises this session    PLAN  []  Upgrade activities as tolerated     [x]  Continue plan of care  []  Update interventions per flow sheet       []  Discharge due to:_  []  Other:_      [address] [facility] [facility] [address] DAYNA Bustamante PT 4/2/2018  7:48 AM

## 2018-04-02 NOTE — TELEPHONE ENCOUNTER
From: Christiano Roca  To: Geovani Munroe MD  Sent: 3/31/2018 9:31 AM EDT  Subject: Medication Renewal Request    Original authorizing provider: MD Christiano Pascual would like a refill of the following medications:  zolpidem (AMBIEN) 10 mg tablet Geovani Munroe MD]    Preferred pharmacy: [facility]/PHARMACY #[id number]- [address] [facility] [address]     Comment:  Dr. Rogelio Gustafson, Please send refill for Ambien 10mg #90 with 2 refills to [facility] on [address] [facility] in [place]. [phone].  Thanks Denton Granados

## 2018-04-03 ENCOUNTER — TELEPHONE (OUTPATIENT)
Dept: INTERNAL MEDICINE CLINIC | Age: 63
End: 2018-04-03

## 2018-04-04 ENCOUNTER — APPOINTMENT (OUTPATIENT)
Dept: PHYSICAL THERAPY | Age: 63
End: 2018-04-04
Payer: COMMERCIAL

## 2018-05-09 ENCOUNTER — HOSPITAL ENCOUNTER (OUTPATIENT)
Dept: PHYSICAL THERAPY | Age: 63
Discharge: HOME | End: 2018-05-09
Payer: COMMERCIAL

## 2018-05-09 PROCEDURE — 97110 THERAPEUTIC EXERCISES: CPT

## 2018-05-09 PROCEDURE — 97140 MANUAL THERAPY 1/> REGIONS: CPT

## 2018-05-09 NOTE — PROGRESS NOTES
PT DAILY TREATMENT NOTE     Patient Name: Christiano Roca  Date:2018  : 1955  [x]  Patient  Verified  Payor: Adair Oquendo / [facility] [employer] PPO / Product Type: PPO /    In time:8:30  Out time:9:10  Total Treatment Time (min): 40  Total Timed Codes (min): 40  1:1 Treatment Time (min):    Visit #: 3 of     Treatment Area: Neck pain [M54.2]  Cervical radiculopathy [M54.12]    SUBJECTIVE  Pain Level (0-10 scale):  2  Any medication changes, allergies to medications, adverse drug reactions, diagnosis change, or new procedure performed?: [x] No    [] Yes (see summary sheet for update)  Subjective functional status/changes:   [] No changes reported  See Progress Note    OBJECTIVE  Modality rationale: Not done   Min Type Additional Details    [] Estim: []Att   []Unatt        []TENS instruct                  []IFC  []Premod   []NMES                     []Other:  []w/US   []w/ice   []w/heat  Position:  Location:    [x]  Traction: [x] Cervical       []Lumbar                       [] Prone          [x]Supine                       [x]Intermittent   []Continuous Lbs:  [] before manual  [] after manual    []  Ultrasound: []Continuous   [] Pulsed                           []1MHz   []3MHz Location:  W/cm2:    []  Iontophoresis with dexamethasone         Location: [] Take home patch   [] In clinic    []  Ice     []  heat  []  Ice massage Position:  Location:    []  Vasopneumatic Device Pressure:       [] lo [] med [] hi   Temperature: [] lo [] med [] hi   [] Skin assessment post-treatment:  []intact []redness- no adverse reaction       []redness  adverse reaction:       20 min Therapeutic Exercise:  [] See flow sheet :   Rationale: increase ROM and increase strength to improve the patients ability to change and maintain head and shoulder positions    20 min Manual Therapy:  TPR/mobilization to T-spine, R UE neural glides, manual cervical distraction/retraction   Rationale: increase tissue extensibility to improve the patient's ability to change and maintain head and shoulder positions    Pain Level (0-10 scale) post treatment: 2    ASSESSMENT/Changes in Function:   Patient will continue to benefit from skilled PT services to modify and progress therapeutic interventions, analyze and address soft tissue restrictions and analyze and cue movement patterns to attain remaining goals.      []  See Plan of Care  [x]  See progress note/recertification  []  See Discharge Summary     PLAN  []  Upgrade activities as tolerated     [x]  Continue plan of care  []  Update interventions per flow sheet       []  Discharge due to:_  []  Other:_      Yinka Seals, PT 5/9/2018  7:56 AM

## 2018-05-09 NOTE — PROGRESS NOTES
[facility] AT [address], [address] [place] [address] Road  Phone: [phone]   [phone]  PROGRESS NOTE  Patient Name: Prasanna Mcgregor : [id number]   Treatment/Medical Diagnosis: Neck pain [M54.2]  Cervical radiculopathy [M54.12]   Referral Source: Rex Medina MD     Date of Initial Visit: 3/26/18 Attended Visits: 2 Missed Visits: 0     SUMMARY OF TREATMENT  Therapeutic exercise for cervical stabilization, mechanical cervical traction, modalities PRN, HEP  CURRENT STATUS  Pt is responding slowly but favorably to therapy. He states he has adjusted his HEP based on recommendations from his last therapy visit and has been able to see a general decrease in frequency/intensity of right arm numbness. He still reports inability to look up for any period of time due to this finding, and he denies significant neck pain at this time (2/10). No radicular motor weakness is observed. Today HEP was reviewed and updated to emphasis more thoracic mobility due to patient's extensive work travel schedule. · Short Term Goals: To be accomplished in  6  treatments: Independent/compliant with long term HEP for cervical posture and mobility- Met and ongoing  Patient will be educated on sitting posture modification to reduce musculoskeletal strain with sitting ADL's- Met  · Long Term Goals: To be accomplished in  12  treatments:  Improve FOTO outcome score by 9% to indicate a significant improvement in ADL function- Met, improved by 11%  Pt will report at least 50% improvement with daily neck movements, including at faster speeds- Not met , but progressing  RECOMMENDATIONS  Pt will follow up with PT  in 3 to 4 weeks due to extensive work travel. If you have any questions/comments please contact us directly at [phone]. Thank you for allowing us to assist in the care of your patient.     Therapist Signature: Marcin Monroy PT, OCS Date: 2018 Time: 7:52 AM   NOTE TO PHYSICIAN:  PLEASE COMPLETE THE ORDERS BELOW AND FAX TO   [facility] UNIT: [phone]. If you are unable to process this request in 24 hours please contact our office: [phone].    ___ I have read the above report and request that my patient continue as recommended.   ___ I have read the above report and request that my patient continue therapy with the following changes/special instructions:_________________________________________________________   ___ I have read the above report and request that my patient be discharged from therapy.      Physician Signature:        Date:       Time:

## 2018-05-29 RX ORDER — TAMSULOSIN HYDROCHLORIDE 0.4 MG/1
CAPSULE ORAL
Qty: 90 CAP | Refills: 3 | Status: SHIPPED | OUTPATIENT
Start: 2018-05-29 | End: 2018-08-28 | Stop reason: SDUPTHER

## 2018-06-09 DIAGNOSIS — F51.01 PRIMARY INSOMNIA: ICD-10-CM

## 2018-06-11 NOTE — TELEPHONE ENCOUNTER
From: Ab Parker  To: Janiya El MD  Sent: 6/9/2018 8:03 AM EDT  Subject: Medication Renewal Request    Original authorizing provider: MD Ab Cole would like a refill of the following medications:  zolpidem (AMBIEN) 10 mg tablet Janiya El MD]    Preferred pharmacy: [address] [place] [address], [address]    Comment:  Dr. Norbert Brown, can you please call in a Rx to [phone] for: Ambien 10 mg #90 with 2 refills.  thanks Darío Oconnor

## 2018-06-12 ENCOUNTER — TELEPHONE (OUTPATIENT)
Dept: INTERNAL MEDICINE CLINIC | Age: 63
End: 2018-06-12

## 2018-06-12 RX ORDER — ZOLPIDEM TARTRATE 10 MG/1
10 TABLET ORAL DAILY
Qty: 90 TAB | Refills: 0 | Status: SHIPPED | OUTPATIENT
Start: 2018-06-12

## 2018-07-17 NOTE — PROGRESS NOTES
[facility] AT [address], [address] [place] [address] Road  Phone: [phone]   [phone]  DISCHARGE SUMMARY  Patient Name: Mitzi Zamarripa :    Treatment/Medical Diagnosis: Neck pain [M54.2]  Cervical radiculopathy [M54.12]   Referral Source: Denia Wright MD     Date of Initial Visit: 3/26/18 Attended Visits: 3 Missed Visits: 0     SUMMARY OF TREATMENT  Cervical traction, manual therapy to C-spine, therapeutic exercise for cervical mobility/stabilization  CURRENT STATUS  Patient attended 3 therapy sessions for cervical radiculopathy between 3/26/18 and 18. Pt reported slow but noticeable decrease in c/o right arm numbness at his last visit, but still reported significant limitation with cervical extension. Patient was unable to attend therapy regularly due to work travel schedule, but has not returned to therapy since 18 and has been discharged at this time. · Short Term Goals: To be accomplished in  6  treatments: Independent/compliant with long term HEP for cervical posture and mobility- Met and ongoing  Patient will be educated on sitting posture modification to reduce musculoskeletal strain with sitting ADL's- Met  · Long Term Goals: To be accomplished in  12  treatments:  Improve FOTO outcome score by 9% to indicate a significant improvement in ADL function- Met, improved by 11%  Pt will report at least 50% improvement with daily neck movements, including at faster speeds- Not met , but progressing  RECOMMENDATIONS  Discontinue therapy; patient has not been to therapy in > 2 months. If you have any questions/comments please contact us directly at [phone]. Thank you for allowing us to assist in the care of your patient.     Therapist Signature: Tejinder Lee PT, OCS Date: 18     Time: 10:17 AM

## 2018-08-13 ENCOUNTER — HOSPITAL ENCOUNTER (OUTPATIENT)
Dept: LAB | Age: 63
Discharge: HOME | End: 2018-08-13
Payer: COMMERCIAL

## 2018-08-13 ENCOUNTER — OFFICE VISIT (OUTPATIENT)
Dept: INTERNAL MEDICINE CLINIC | Age: 63
End: 2018-08-13

## 2018-08-13 VITALS
SYSTOLIC BLOOD PRESSURE: 130 MMHG | HEART RATE: 86 BPM | RESPIRATION RATE: 22 BRPM | TEMPERATURE: 98 F | BODY MASS INDEX: 29.97 KG/M2 | DIASTOLIC BLOOD PRESSURE: 72 MMHG | OXYGEN SATURATION: 96 % | WEIGHT: 259 LBS | HEIGHT: 78 IN

## 2018-08-13 DIAGNOSIS — Z00.00 ANNUAL PHYSICAL EXAM: Primary | ICD-10-CM

## 2018-08-13 DIAGNOSIS — Z00.00 ANNUAL PHYSICAL EXAM: ICD-10-CM

## 2018-08-13 DIAGNOSIS — F51.01 PRIMARY INSOMNIA: ICD-10-CM

## 2018-08-13 LAB
ALBUMIN SERPL-MCNC: 4 G/DL (ref 3.4–5)
ALBUMIN/GLOB SERPL: 1.3 {RATIO} (ref 0.8–1.7)
ALP SERPL-CCNC: 75 U/L (ref 45–117)
ALT SERPL-CCNC: 24 U/L (ref 16–61)
ANION GAP SERPL CALC-SCNC: 8 MMOL/L (ref 3–18)
AST SERPL-CCNC: 16 U/L (ref 15–37)
BASOPHILS # BLD: 0 K/UL (ref 0–0.1)
BASOPHILS NFR BLD: 1 % (ref 0–2)
BILIRUB SERPL-MCNC: 0.5 MG/DL (ref 0.2–1)
BUN SERPL-MCNC: 14 MG/DL (ref 7–18)
BUN/CREAT SERPL: 16 (ref 12–20)
CALCIUM SERPL-MCNC: 9.4 MG/DL (ref 8.5–10.1)
CHLORIDE SERPL-SCNC: 108 MMOL/L (ref 100–108)
CHOLEST SERPL-MCNC: 178 MG/DL
CO2 SERPL-SCNC: 28 MMOL/L (ref 21–32)
CREAT SERPL-MCNC: 0.87 MG/DL (ref 0.6–1.3)
DIFFERENTIAL METHOD BLD: NORMAL
EOSINOPHIL # BLD: 0.3 K/UL (ref 0–0.4)
EOSINOPHIL NFR BLD: 4 % (ref 0–5)
ERYTHROCYTE [DISTWIDTH] IN BLOOD BY AUTOMATED COUNT: 14.2 % (ref 11.6–14.5)
GLOBULIN SER CALC-MCNC: 3.1 G/DL (ref 2–4)
GLUCOSE SERPL-MCNC: 103 MG/DL (ref 74–99)
HCT VFR BLD AUTO: 45.9 % (ref 36–48)
HDLC SERPL-MCNC: 50 MG/DL (ref 40–60)
HDLC SERPL: 3.6 {RATIO} (ref 0–5)
HGB BLD-MCNC: 15.3 G/DL (ref 13–16)
LDLC SERPL CALC-MCNC: 94.8 MG/DL (ref 0–100)
LIPID PROFILE,FLP: ABNORMAL
LYMPHOCYTES # BLD: 1.8 K/UL (ref 0.9–3.6)
LYMPHOCYTES NFR BLD: 26 % (ref 21–52)
MCH RBC QN AUTO: 30.8 PG (ref 24–34)
MCHC RBC AUTO-ENTMCNC: 33.3 G/DL (ref 31–37)
MCV RBC AUTO: 92.5 FL (ref 74–97)
MONOCYTES # BLD: 0.6 K/UL (ref 0.05–1.2)
MONOCYTES NFR BLD: 8 % (ref 3–10)
NEUTS SEG # BLD: 4.2 K/UL (ref 1.8–8)
NEUTS SEG NFR BLD: 61 % (ref 40–73)
PLATELET # BLD AUTO: 207 K/UL (ref 135–420)
PMV BLD AUTO: 10.4 FL (ref 9.2–11.8)
POTASSIUM SERPL-SCNC: 4.7 MMOL/L (ref 3.5–5.5)
PROT SERPL-MCNC: 7.1 G/DL (ref 6.4–8.2)
PSA SERPL-MCNC: 1 NG/ML (ref 0–4)
RBC # BLD AUTO: 4.96 M/UL (ref 4.7–5.5)
SODIUM SERPL-SCNC: 144 MMOL/L (ref 136–145)
TRIGL SERPL-MCNC: 166 MG/DL (ref ?–150)
VLDLC SERPL CALC-MCNC: 33.2 MG/DL
WBC # BLD AUTO: 6.9 K/UL (ref 4.6–13.2)

## 2018-08-13 PROCEDURE — 85025 COMPLETE CBC W/AUTO DIFF WBC: CPT | Performed by: INTERNAL MEDICINE

## 2018-08-13 PROCEDURE — 80053 COMPREHEN METABOLIC PANEL: CPT | Performed by: INTERNAL MEDICINE

## 2018-08-13 PROCEDURE — 36415 COLL VENOUS BLD VENIPUNCTURE: CPT | Performed by: INTERNAL MEDICINE

## 2018-08-13 PROCEDURE — 84153 ASSAY OF PSA TOTAL: CPT | Performed by: INTERNAL MEDICINE

## 2018-08-13 PROCEDURE — 80061 LIPID PANEL: CPT | Performed by: INTERNAL MEDICINE

## 2018-08-13 RX ORDER — SILDENAFIL 100 MG/1
100 TABLET, FILM COATED ORAL AS NEEDED
Qty: 30 TAB | Refills: 6 | Status: SHIPPED | OUTPATIENT
Start: 2018-08-13 | End: 2019-10-11 | Stop reason: SDUPTHER

## 2018-08-13 RX ORDER — ZOLPIDEM TARTRATE 10 MG/1
10 TABLET ORAL
Qty: 90 TAB | Refills: 2 | Status: SHIPPED | OUTPATIENT
Start: 2018-08-13 | End: 2019-03-24 | Stop reason: SDUPTHER

## 2018-08-13 NOTE — PROGRESS NOTES
Chief Complaint   Patient presents with    Well Male       Depression Screening:  PHQ over the last two weeks 8/13/2018   Little interest or pleasure in doing things Not at all   Feeling down, depressed, irritable, or hopeless Not at all   Total Score PHQ 2 0         1. Have you been to the ER, urgent care clinic since your last visit? Hospitalized since your last visit? No    2. Have you seen or consulted any other health care providers outside of the [address] [place] [address] since your last visit? Include any pap smears or colon screening.  No

## 2018-08-13 NOTE — PROGRESS NOTES
The patient presents to the office today for a check up    HPI    The patient presents to the office today for a check up. The patient remains on medications sleep and BPH. The patient is doing ok on the medicaitons. The patient is due for lab work and a screening colonoscopy      Review of Systems   Respiratory: Negative for shortness of breath. Cardiovascular: Negative for chest pain and leg swelling. No Known Allergies    Current Outpatient Prescriptions   Medication Sig Dispense Refill    sildenafil citrate (VIAGRA) 100 mg tablet Take 1 Tab by mouth as needed. 30 Tab 6    zolpidem (AMBIEN) 10 mg tablet Take 1 Tab by mouth nightly. Max Daily Amount: 10 mg. 90 Tab 2    varicella-zoster recombinant, PF, (SHINGRIX, PF,) 50 mcg/0.5 mL susr injection 0.5 ml IM for the first dose. Repeat in 3 months 0.5 mL 1    tamsulosin (FLOMAX) 0.4 mg capsule TAKE 1 CAPSULE BY MOUTH DAILY. 90 Cap 3    zolpidem (AMBIEN) 10 mg tablet Take 1 Tab by mouth daily. Max Daily Amount: 10 mg. 90 Tab 0    zolpidem (AMBIEN) 10 mg tablet Take 1 Tab by mouth nightly. Max Daily Amount: 10 mg. 90 Tab 2    zolpidem (AMBIEN) 10 mg tablet TAKE 1 TABLET BY MOUTH AT BEDTIME AS NEEDED FOR SLEEP 30 Tab 3       Past Medical History:   Diagnosis Date    Herniated disc, cervical     c5 c6 c7     Insomnia        History reviewed. No pertinent surgical history. Social History     Social History    Marital status:      Spouse name: N/A    Number of children: N/A    Years of education: N/A     Occupational History    Not on file.      Social History Main Topics    Smoking status: Current Every Day Smoker     Packs/day: 1.00    Smokeless tobacco: Never Used    Alcohol use Yes    Drug use: No    Sexual activity: Yes     Partners: Female     Other Topics Concern    Not on file     Social History Narrative       Patient does not have an advanced directive on file    Visit Vitals    /72 (BP 1 Location: Right arm, BP Patient Position: Sitting)    Pulse 86    Temp 98 °F (36.7 °C) (Tympanic)    Resp 22    Ht 6' 6\" (1.981 m)    Wt 259 lb (117.5 kg)    SpO2 96%    BMI 29.93 kg/m2       Physical Exam   No Cervical Lymphadenopathy  No Supraclavicular Lymphadenopathy  Thyroid is Normal  Lungs are normal to percussion. Clear to auscultation   Heart:  S1 S2 are normal, No gallops, No mummers  No Carotid Bruits  Abdomen:  Normal Bowel Sounds. No tenderness. No masses. No Hepatomegaly or Splenomegly  LE:  Strong Pedal Pulses. No Edema    BMI:  The patient was advised to limit fat to 20% of the calories - approximately 60 grams      No visits with results within 3 Month(s) from this visit. Latest known visit with results is:    Orders Only on 10/23/2015   Component Date Value Ref Range Status    WBC 10/23/2015 7.4  3.4 - 10.8 x10E3/uL Final    RBC 10/23/2015 4.75  4.14 - 5.80 x10E6/uL Final    HGB 10/23/2015 14.8  12.6 - 17.7 g/dL Final    HCT 10/23/2015 42.0  37.5 - 51.0 % Final    MCV 10/23/2015 88  79 - 97 fL Final    MCH 10/23/2015 31.2  26.6 - 33.0 pg Final    MCHC 10/23/2015 35.2  31.5 - 35.7 g/dL Final    RDW 10/23/2015 14.0  12.3 - 15.4 % Final    PLATELET 95/14/0803 629  150 - 379 x10E3/uL Final    NEUTROPHILS 10/23/2015 58  % Final    Lymphocytes 10/23/2015 28  % Final    MONOCYTES 10/23/2015 9  % Final    EOSINOPHILS 10/23/2015 4  % Final    BASOPHILS 10/23/2015 1  % Final    ABS. NEUTROPHILS 10/23/2015 4.4  1.4 - 7.0 x10E3/uL Final    Abs Lymphocytes 10/23/2015 2.1  0.7 - 3.1 x10E3/uL Final    ABS. MONOCYTES 10/23/2015 0.7  0.1 - 0.9 x10E3/uL Final    ABS. EOSINOPHILS 10/23/2015 0.3  0.0 - 0.4 x10E3/uL Final    ABS. BASOPHILS 10/23/2015 0.0  0.0 - 0.2 x10E3/uL Final    IMMATURE GRANULOCYTES 10/23/2015 0  % Final    ABS. IMM. GRANS.  10/23/2015 0.0  0.0 - 0.1 x10E3/uL Final    Glucose 10/23/2015 91  65 - 99 mg/dL Final    BUN 10/23/2015 14  6 - 24 mg/dL Final    Creatinine 10/23/2015 0.84  0.76 - 1.27 mg/dL Final    GFR est non-AA 10/23/2015 97  >59 mL/min/1.73 Final    GFR est AA 10/23/2015 112  >59 mL/min/1.73 Final    BUN/Creatinine ratio 10/23/2015 17  9 - 20 Final    Sodium 10/23/2015 140  134 - 144 mmol/L Final    Potassium 10/23/2015 4.5  3.5 - 5.2 mmol/L Final    Chloride 10/23/2015 99  97 - 108 mmol/L Final    CO2 10/23/2015 22  18 - 29 mmol/L Final    Calcium 10/23/2015 9.4  8.7 - 10.2 mg/dL Final    Cholesterol, total 10/23/2015 194  100 - 199 mg/dL Final    Comment: **Effective October 26, 2015 the reference interval**    for Cholesterol, Total will be changing to:                           0 - 19 years       100 - 169                              >19 years       100 - 199      Triglyceride 10/23/2015 225* 0 - 149 mg/dL Final    Comment: **Effective October 26, 2015 the reference interval**    for Triglycerides will be changing to:                           0 -  9 years         0 -  74                          10 - 19 years         0 -  89                              >19 years         0 - 149      HDL Cholesterol 10/23/2015 36* >39 mg/dL Final    Comment: According to ATP-III Guidelines, HDL-C >59 mg/dL is considered a  negative risk factor for CHD.  VLDL, calculated 10/23/2015 45* 5 - 40 mg/dL Final    LDL, calculated 10/23/2015 113* 0 - 99 mg/dL Final    Comment: **Effective October 26, 2015 the reference interval**    for LDL Cholesterol Calc will be changing to:                           0 - 19 years         0 - 109                              >19 years         0 - 99      Prostate Specific Ag 10/23/2015 0.6  0.0 - 4.0 ng/mL Final    Comment: Roche ECLIA methodology. According to the American Urological Association, Serum PSA should  decrease and remain at undetectable levels after radical  prostatectomy. The AUA defines biochemical recurrence as an initial  PSA value 0.2 ng/mL or greater followed by a subsequent confirmatory  PSA value 0.2 ng/mL or greater.   Values obtained with different assay methods or kits cannot be used  interchangeably. Results cannot be interpreted as absolute evidence  of the presence or absence of malignant disease. .No results found for any visits on 08/13/18. Assessment / Plan      ICD-10-CM ICD-9-CM    1. Annual physical exam Z00.00 V70.0 CBC WITH AUTOMATED DIFF      METABOLIC PANEL, COMPREHENSIVE      LIPID PANEL      PSA SCREENING (SCREENING)   2. Primary insomnia F51.01 307.42 zolpidem (AMBIEN) 10 mg tablet       I advised the patient to  good health habits  Labs ordered  he was advised to continue his maintenance medications - Prescription for Shingrix was given  The patient is ok for a screening colonoscopy    Follow-up Disposition:  Return in about 6 months (around 2/13/2019). I asked Robe Rodriguez if he has any questions and I answered the questions.   Robe Rodriguez states that he understands the treatment plan and agrees with the treatment plan

## 2018-08-28 NOTE — TELEPHONE ENCOUNTER
Requested Prescriptions     Pending Prescriptions Disp Refills    tamsulosin (FLOMAX) 0.4 mg capsule 90 Cap 3     Sig: TAKE 1 CAPSULE BY MOUTH DAILY.

## 2018-08-29 RX ORDER — TAMSULOSIN HYDROCHLORIDE 0.4 MG/1
CAPSULE ORAL
Qty: 90 CAP | Refills: 3 | Status: SHIPPED | OUTPATIENT
Start: 2018-08-29 | End: 2019-08-20 | Stop reason: SDUPTHER

## 2018-09-17 RX ORDER — TAMSULOSIN HYDROCHLORIDE 0.4 MG/1
CAPSULE ORAL
Qty: 90 CAP | Refills: 3 | Status: CANCELLED | OUTPATIENT
Start: 2018-09-17

## 2018-11-26 ENCOUNTER — DOCUMENTATION ONLY (OUTPATIENT)
Dept: INTERNAL MEDICINE CLINIC | Age: 63
End: 2018-11-26

## 2018-11-26 NOTE — PROGRESS NOTES
Surgical history updated to reflect latest colonoscopy. Path indicated 3 adenomas. Repeat in 3 years per Dr. Cid See. Health Maintenance frequency updated to reflect this. Report to be sent to  scanning.     Tatum Cano, PharmD, BCACP

## 2019-03-24 DIAGNOSIS — F51.01 PRIMARY INSOMNIA: ICD-10-CM

## 2019-03-25 RX ORDER — ZOLPIDEM TARTRATE 10 MG/1
10 TABLET ORAL
Qty: 90 TAB | Refills: 2 | Status: SHIPPED | OUTPATIENT
Start: 2019-04-22 | End: 2019-10-11 | Stop reason: SDUPTHER

## 2019-03-25 NOTE — TELEPHONE ENCOUNTER
[facility] system checked--last filled 02/04/2019 for 90 start date changed to reflect correct fill date

## 2019-03-30 DIAGNOSIS — F51.01 PRIMARY INSOMNIA: Primary | ICD-10-CM

## 2019-03-31 RX ORDER — ZOLPIDEM TARTRATE 10 MG/1
TABLET ORAL
Qty: 90 TAB | Refills: 0
Start: 2019-03-31 | End: 2019-06-16 | Stop reason: SDUPTHER

## 2019-06-16 DIAGNOSIS — F51.01 PRIMARY INSOMNIA: ICD-10-CM

## 2019-06-17 DIAGNOSIS — F51.01 PRIMARY INSOMNIA: Primary | ICD-10-CM

## 2019-06-17 RX ORDER — ZOLPIDEM TARTRATE 10 MG/1
10 TABLET ORAL
Qty: 90 TAB | Refills: 1 | Status: SHIPPED | OUTPATIENT
Start: 2019-06-17

## 2019-06-18 ENCOUNTER — DOCUMENTATION ONLY (OUTPATIENT)
Dept: FAMILY MEDICINE CLINIC | Facility: CLINIC | Age: 64
End: 2019-06-18

## 2019-06-18 RX ORDER — ZOLPIDEM TARTRATE 10 MG/1
TABLET ORAL
Qty: 90 TAB | Refills: 0 | Status: SHIPPED | OUTPATIENT
Start: 2019-06-18 | End: 2019-10-03 | Stop reason: SDUPTHER

## 2019-08-22 RX ORDER — TAMSULOSIN HYDROCHLORIDE 0.4 MG/1
CAPSULE ORAL
Qty: 90 CAP | Refills: 3 | Status: SHIPPED | OUTPATIENT
Start: 2019-08-22 | End: 2019-10-11 | Stop reason: SDUPTHER

## 2019-10-03 DIAGNOSIS — F51.01 PRIMARY INSOMNIA: ICD-10-CM

## 2019-10-03 RX ORDER — ZOLPIDEM TARTRATE 10 MG/1
TABLET ORAL
Qty: 90 TAB | Refills: 2 | Status: SHIPPED | OUTPATIENT
Start: 2019-10-03

## 2019-10-11 ENCOUNTER — HOSPITAL ENCOUNTER (OUTPATIENT)
Dept: LAB | Age: 64
Discharge: HOME | End: 2019-10-11
Payer: COMMERCIAL

## 2019-10-11 ENCOUNTER — OFFICE VISIT (OUTPATIENT)
Dept: FAMILY MEDICINE CLINIC | Facility: CLINIC | Age: 64
End: 2019-10-11

## 2019-10-11 VITALS
WEIGHT: 249 LBS | DIASTOLIC BLOOD PRESSURE: 86 MMHG | TEMPERATURE: 97 F | OXYGEN SATURATION: 99 % | HEIGHT: 78 IN | SYSTOLIC BLOOD PRESSURE: 132 MMHG | RESPIRATION RATE: 12 BRPM | HEART RATE: 81 BPM | BODY MASS INDEX: 28.81 KG/M2

## 2019-10-11 DIAGNOSIS — Z00.00 ANNUAL PHYSICAL EXAM: ICD-10-CM

## 2019-10-11 DIAGNOSIS — F51.01 PRIMARY INSOMNIA: ICD-10-CM

## 2019-10-11 DIAGNOSIS — Z00.00 ANNUAL PHYSICAL EXAM: Primary | ICD-10-CM

## 2019-10-11 LAB
ALBUMIN SERPL-MCNC: 4.2 G/DL (ref 3.4–5)
ALBUMIN/GLOB SERPL: 1.4 {RATIO} (ref 0.8–1.7)
ALP SERPL-CCNC: 73 U/L (ref 45–117)
ALT SERPL-CCNC: 26 U/L (ref 16–61)
ANION GAP SERPL CALC-SCNC: 6 MMOL/L (ref 3–18)
AST SERPL-CCNC: 13 U/L (ref 10–38)
BASOPHILS # BLD: 0 K/UL (ref 0–0.1)
BASOPHILS NFR BLD: 1 % (ref 0–2)
BILIRUB SERPL-MCNC: 0.5 MG/DL (ref 0.2–1)
BUN SERPL-MCNC: 14 MG/DL (ref 7–18)
BUN/CREAT SERPL: 19 (ref 12–20)
CALCIUM SERPL-MCNC: 9.2 MG/DL (ref 8.5–10.1)
CHLORIDE SERPL-SCNC: 106 MMOL/L (ref 100–111)
CO2 SERPL-SCNC: 26 MMOL/L (ref 21–32)
CREAT SERPL-MCNC: 0.74 MG/DL (ref 0.6–1.3)
DIFFERENTIAL METHOD BLD: ABNORMAL
EOSINOPHIL # BLD: 0.3 K/UL (ref 0–0.4)
EOSINOPHIL NFR BLD: 4 % (ref 0–5)
ERYTHROCYTE [DISTWIDTH] IN BLOOD BY AUTOMATED COUNT: 13.8 % (ref 11.6–14.5)
GLOBULIN SER CALC-MCNC: 3.1 G/DL (ref 2–4)
GLUCOSE SERPL-MCNC: 90 MG/DL (ref 74–99)
HCT VFR BLD AUTO: 47.6 % (ref 36–48)
HGB BLD-MCNC: 16.2 G/DL (ref 13–16)
LYMPHOCYTES # BLD: 2 K/UL (ref 0.9–3.6)
LYMPHOCYTES NFR BLD: 28 % (ref 21–52)
MCH RBC QN AUTO: 31.1 PG (ref 24–34)
MCHC RBC AUTO-ENTMCNC: 34 G/DL (ref 31–37)
MCV RBC AUTO: 91.4 FL (ref 74–97)
MONOCYTES # BLD: 0.6 K/UL (ref 0.05–1.2)
MONOCYTES NFR BLD: 8 % (ref 3–10)
NEUTS SEG # BLD: 4.3 K/UL (ref 1.8–8)
NEUTS SEG NFR BLD: 59 % (ref 40–73)
PLATELET # BLD AUTO: 240 K/UL (ref 135–420)
PMV BLD AUTO: 9.8 FL (ref 9.2–11.8)
POTASSIUM SERPL-SCNC: 4.4 MMOL/L (ref 3.5–5.5)
PROT SERPL-MCNC: 7.3 G/DL (ref 6.4–8.2)
RBC # BLD AUTO: 5.21 M/UL (ref 4.7–5.5)
SODIUM SERPL-SCNC: 138 MMOL/L (ref 136–145)
WBC # BLD AUTO: 7.2 K/UL (ref 4.6–13.2)

## 2019-10-11 PROCEDURE — 85025 COMPLETE CBC W/AUTO DIFF WBC: CPT

## 2019-10-11 PROCEDURE — 80061 LIPID PANEL: CPT

## 2019-10-11 PROCEDURE — 80053 COMPREHEN METABOLIC PANEL: CPT

## 2019-10-11 PROCEDURE — 36415 COLL VENOUS BLD VENIPUNCTURE: CPT

## 2019-10-11 PROCEDURE — 84153 ASSAY OF PSA TOTAL: CPT

## 2019-10-11 RX ORDER — TAMSULOSIN HYDROCHLORIDE 0.4 MG/1
CAPSULE ORAL
Qty: 90 CAP | Refills: 3 | Status: SHIPPED | OUTPATIENT
Start: 2019-10-11

## 2019-10-11 RX ORDER — ZOLPIDEM TARTRATE 10 MG/1
10 TABLET ORAL
Qty: 30 TAB | Refills: 1 | Status: SHIPPED | OUTPATIENT
Start: 2019-10-11

## 2019-10-11 RX ORDER — SILDENAFIL 100 MG/1
TABLET, FILM COATED ORAL
Qty: 30 TAB | Refills: 2 | Status: SHIPPED | OUTPATIENT
Start: 2019-10-11

## 2019-10-11 NOTE — PROGRESS NOTES
Mari Lofton presents today for   Chief Complaint   Patient presents with    Medication Refill       Is someone accompanying this pt? no    Is the patient using any DME equipment during OV? no    Depression Screening:  3 most recent PHQ Screens 10/11/2019   Little interest or pleasure in doing things Not at all   Feeling down, depressed, irritable, or hopeless Not at all   Total Score PHQ 2 0       Learning Assessment:  No flowsheet data found. Abuse Screening:  No flowsheet data found. Fall Risk  No flowsheet data found. Health Maintenance reviewed and discussed and ordered per Provider. Health Maintenance Due   Topic Date Due    Hepatitis C Screening  1955    Pneumococcal 0-64 years (1 of 1 - PPSV23) 04/15/1963    DTaP/Tdap/Td series (1 - Tdap) 04/15/1978    Shingrix Vaccine Age 50> (1 of 2) 04/15/2007    Influenza Age 5 to Adult  08/01/2019           Coordination of Care:  1. Have you been to the ER, urgent care clinic since your last visit? Hospitalized since your last visit? no    2. Have you seen or consulted any other health care providers outside of the [address] [place] [address] since your last visit? Include any pap smears or colon screening.  no

## 2019-10-12 LAB
CHOLEST SERPL-MCNC: 220 MG/DL
HDLC SERPL-MCNC: 45 MG/DL (ref 40–60)
HDLC SERPL: 4.9 {RATIO} (ref 0–5)
LDLC SERPL CALC-MCNC: 137.6 MG/DL (ref 0–100)
LIPID PROFILE,FLP: ABNORMAL
PSA SERPL-MCNC: 1 NG/ML (ref 0–4)
TRIGL SERPL-MCNC: 187 MG/DL (ref ?–150)
VLDLC SERPL CALC-MCNC: 37.4 MG/DL

## 2019-10-13 NOTE — PROGRESS NOTES
The patient presents to the office today for a check up    HPI    The patient presents to the office today for a check up. The patient remains on Ambien for sleep . The patient has no complaints other than sleep. Recent labs look good. Review of Systems   Respiratory: Negative for shortness of breath. Cardiovascular: Negative for chest pain and leg swelling. Psychiatric/Behavioral: The patient has insomnia. No Known Allergies    Current Outpatient Medications   Medication Sig Dispense Refill    sildenafil citrate (VIAGRA) 100 mg tablet 1 tab 45 min before sexual activity 30 Tab 2    tamsulosin (FLOMAX) 0.4 mg capsule TAKE 1 CAPSULE BY MOUTH EVERY DAY 90 Cap 3    zolpidem (AMBIEN) 10 mg tablet Take 1 Tab by mouth nightly. Max Daily Amount: 10 mg. 30 Tab 1    zolpidem (AMBIEN) 10 mg tablet TAKE 1 TABLET BY MOUTH NIGHTLY. MAX. DAILY AMOUNT: 1 TABLET (10MG) 90 Tab 2    zolpidem (AMBIEN) 10 mg tablet Take 1 Tab by mouth nightly as needed for Sleep. Max Daily Amount: 10 mg. 90 Tab 1    varicella-zoster recombinant, PF, (SHINGRIX, PF,) 50 mcg/0.5 mL susr injection 0.5 ml IM for the first dose. Repeat in 3 months 0.5 mL 1    zolpidem (AMBIEN) 10 mg tablet Take 1 Tab by mouth daily. Max Daily Amount: 10 mg. 90 Tab 0    zolpidem (AMBIEN) 10 mg tablet Take 1 Tab by mouth nightly.  Max Daily Amount: 10 mg. 90 Tab 2    zolpidem (AMBIEN) 10 mg tablet TAKE 1 TABLET BY MOUTH AT BEDTIME AS NEEDED FOR SLEEP 30 Tab 3       Past Medical History:   Diagnosis Date    Herniated disc, cervical     c5 c6 c7     Insomnia        Past Surgical History:   Procedure Laterality Date    HX COLONOSCOPY  08/16/2018    Dr. Odalis Govea, 3 adenomas - repeat in 3 years       Social History     Socioeconomic History    Marital status:      Spouse name: Not on file    Number of children: Not on file    Years of education: Not on file    Highest education level: Not on file   Occupational History    Not on file   Social Needs    Financial resource strain: Not on file    Food insecurity:     Worry: Not on file     Inability: Not on file    Transportation needs:     Medical: Not on file     Non-medical: Not on file   Tobacco Use    Smoking status: Current Every Day Smoker     Packs/day: 1.00    Smokeless tobacco: Never Used   Substance and Sexual Activity    Alcohol use: Yes    Drug use: No    Sexual activity: Yes     Partners: Female   Lifestyle    Physical activity:     Days per week: Not on file     Minutes per session: Not on file    Stress: Not on file   Relationships    Social connections:     Talks on phone: Not on file     Gets together: Not on file     Attends Taoism service: Not on file     Active member of club or organization: Not on file     Attends meetings of clubs or organizations: Not on file     Relationship status: Not on file    Intimate partner violence:     Fear of current or ex partner: Not on file     Emotionally abused: Not on file     Physically abused: Not on file     Forced sexual activity: Not on file   Other Topics Concern    Not on file   Social History Narrative    Not on file       Patient does not have an advanced directive on file    Visit Vitals  /86   Pulse 81   Temp 97 °F (36.1 °C) (Oral)   Resp 12   Ht 6' 6\" (1.981 m)   Wt 249 lb (112.9 kg)   SpO2 99%   BMI 28.77 kg/m²       Physical Exam  No Cervical Lymphadenopathy  No Supraclavicular Lymphadenopathy  Thyroid is Normal  Lungs are normal to percussion. Clear to auscultation   Heart:  S1 S2 are normal, No gallops, No murmurs  No Carotid Bruits  Abdomen:  Normal Bowel Sounds. No tenderness. No masses. No Hepatomegaly or Splenomegaly  LE:  Strong Pedal Pulses.   No Edema      BMI:  [facility] Outpatient Visit on 10/11/2019   Component Date Value Ref Range Status    WBC 10/11/2019 7.2  4.6 - 13.2 K/uL Final    RBC 10/11/2019 5.21  4.70 - 5.50 M/uL Final    HGB 10/11/2019 16.2* 13.0 - 16.0 g/dL Final    HCT 10/11/2019 47.6  36.0 - 48.0 % Final    MCV 10/11/2019 91.4  74.0 - 97.0 FL Final    MCH 10/11/2019 31.1  24.0 - 34.0 PG Final    MCHC 10/11/2019 34.0  31.0 - 37.0 g/dL Final    RDW 10/11/2019 13.8  11.6 - 14.5 % Final    PLATELET 47/66/9892 957  135 - 420 K/uL Final    MPV 10/11/2019 9.8  9.2 - 11.8 FL Final    NEUTROPHILS 10/11/2019 59  40 - 73 % Final    LYMPHOCYTES 10/11/2019 28  21 - 52 % Final    MONOCYTES 10/11/2019 8  3 - 10 % Final    EOSINOPHILS 10/11/2019 4  0 - 5 % Final    BASOPHILS 10/11/2019 1  0 - 2 % Final    ABS. NEUTROPHILS 10/11/2019 4.3  1.8 - 8.0 K/UL Final    ABS. LYMPHOCYTES 10/11/2019 2.0  0.9 - 3.6 K/UL Final    ABS. MONOCYTES 10/11/2019 0.6  0.05 - 1.2 K/UL Final    ABS. EOSINOPHILS 10/11/2019 0.3  0.0 - 0.4 K/UL Final    ABS. BASOPHILS 10/11/2019 0.0  0.0 - 0.1 K/UL Final    DF 10/11/2019 AUTOMATED    Final    Sodium 10/11/2019 138  136 - 145 mmol/L Final    Potassium 10/11/2019 4.4  3.5 - 5.5 mmol/L Final    Chloride 10/11/2019 106  100 - 111 mmol/L Final    CO2 10/11/2019 26  21 - 32 mmol/L Final    Anion gap 10/11/2019 6  3.0 - 18 mmol/L Final    Glucose 10/11/2019 90  74 - 99 mg/dL Final    BUN 10/11/2019 14  7.0 - 18 MG/DL Final    Creatinine 10/11/2019 0.74  0.6 - 1.3 MG/DL Final    BUN/Creatinine ratio 10/11/2019 19  12 - 20   Final    GFR est AA 10/11/2019 >60  >60 ml/min/1.73m2 Final    GFR est non-AA 10/11/2019 >60  >60 ml/min/1.73m2 Final    Comment: (NOTE)  Estimated GFR is calculated using the Modification of Diet in Renal   Disease (MDRD) Study equation, reported for both  Americans   (GFRAA) and non- Americans (GFRNA), and normalized to 1.73m2   body surface area. The physician must decide which value applies to   the patient. The MDRD study equation should only be used in   individuals age 25 or older.  It has not been validated for the   following: pregnant women, patients with serious comorbid conditions,   or on certain medications, or persons with extremes of body size,   muscle mass, or nutritional status.  Calcium 10/11/2019 9.2  8.5 - 10.1 MG/DL Final    Bilirubin, total 10/11/2019 0.5  0.2 - 1.0 MG/DL Final    ALT (SGPT) 10/11/2019 26  16 - 61 U/L Final    AST (SGOT) 10/11/2019 13  10 - 38 U/L Final    Alk. phosphatase 10/11/2019 73  45 - 117 U/L Final    Protein, total 10/11/2019 7.3  6.4 - 8.2 g/dL Final    Albumin 10/11/2019 4.2  3.4 - 5.0 g/dL Final    Globulin 10/11/2019 3.1  2.0 - 4.0 g/dL Final    A-G Ratio 10/11/2019 1.4  0.8 - 1.7   Final    LIPID PROFILE 10/11/2019        Final    Cholesterol, total 10/11/2019 220* <200 MG/DL Final    Triglyceride 10/11/2019 187* <150 MG/DL Final    Comment: The drugs N-acetylcysteine (NAC) and  Metamiszole have been found to cause falsely  low results in this chemical assay. Please  be sure to submit blood samples obtained  BEFORE administration of either of these  drugs to assure correct results.  HDL Cholesterol 10/11/2019 45  40 - 60 MG/DL Final    LDL, calculated 10/11/2019 137.6* 0 - 100 MG/DL Final    VLDL, calculated 10/11/2019 37.4  MG/DL Final    CHOL/HDL Ratio 10/11/2019 4.9  0 - 5.0   Final    Prostate Specific Ag 10/11/2019 1.0  0.0 - 4.0 ng/mL Final       .  Results for orders placed or performed during the hospital encounter of 10/11/19   CBC WITH AUTOMATED DIFF   Result Value Ref Range    WBC 7.2 4.6 - 13.2 K/uL    RBC 5.21 4.70 - 5.50 M/uL    HGB 16.2 (H) 13.0 - 16.0 g/dL    HCT 47.6 36.0 - 48.0 %    MCV 91.4 74.0 - 97.0 FL    MCH 31.1 24.0 - 34.0 PG    MCHC 34.0 31.0 - 37.0 g/dL    RDW 13.8 11.6 - 14.5 %    PLATELET 010 824 - 307 K/uL    MPV 9.8 9.2 - 11.8 FL    NEUTROPHILS 59 40 - 73 %    LYMPHOCYTES 28 21 - 52 %    MONOCYTES 8 3 - 10 %    EOSINOPHILS 4 0 - 5 %    BASOPHILS 1 0 - 2 %    ABS. NEUTROPHILS 4.3 1.8 - 8.0 K/UL    ABS. LYMPHOCYTES 2.0 0.9 - 3.6 K/UL    ABS. MONOCYTES 0.6 0.05 - 1.2 K/UL    ABS.  EOSINOPHILS 0.3 0.0 - 0.4 K/UL ABS. BASOPHILS 0.0 0.0 - 0.1 K/UL    DF AUTOMATED     METABOLIC PANEL, COMPREHENSIVE   Result Value Ref Range    Sodium 138 136 - 145 mmol/L    Potassium 4.4 3.5 - 5.5 mmol/L    Chloride 106 100 - 111 mmol/L    CO2 26 21 - 32 mmol/L    Anion gap 6 3.0 - 18 mmol/L    Glucose 90 74 - 99 mg/dL    BUN 14 7.0 - 18 MG/DL    Creatinine 0.74 0.6 - 1.3 MG/DL    BUN/Creatinine ratio 19 12 - 20      GFR est AA >60 >60 ml/min/1.73m2    GFR est non-AA >60 >60 ml/min/1.73m2    Calcium 9.2 8.5 - 10.1 MG/DL    Bilirubin, total 0.5 0.2 - 1.0 MG/DL    ALT (SGPT) 26 16 - 61 U/L    AST (SGOT) 13 10 - 38 U/L    Alk. phosphatase 73 45 - 117 U/L    Protein, total 7.3 6.4 - 8.2 g/dL    Albumin 4.2 3.4 - 5.0 g/dL    Globulin 3.1 2.0 - 4.0 g/dL    A-G Ratio 1.4 0.8 - 1.7     LIPID PANEL   Result Value Ref Range    LIPID PROFILE          Cholesterol, total 220 (H) <200 MG/DL    Triglyceride 187 (H) <150 MG/DL    HDL Cholesterol 45 40 - 60 MG/DL    LDL, calculated 137.6 (H) 0 - 100 MG/DL    VLDL, calculated 37.4 MG/DL    CHOL/HDL Ratio 4.9 0 - 5.0     PSA SCREENING (SCREENING)   Result Value Ref Range    Prostate Specific Ag 1.0 0.0 - 4.0 ng/mL       Assessment / Plan      ICD-10-CM ICD-9-CM    1. Annual physical exam Z00.00 V70.0 CBC WITH AUTOMATED DIFF      METABOLIC PANEL, COMPREHENSIVE      LIPID PANEL      PSA SCREENING (SCREENING)   2. Primary insomnia F51.01 307.42 zolpidem (AMBIEN) 10 mg tablet       I advised the patient to continue good health habits  he was advised to continue his maintenance medications    Follow-up and Dispositions    · Return in about 3 months (around 1/11/2020). I asked Ariane Herzog if he has any questions and I answered the questions. Ariane Herzog states that he understands the treatment plan and agrees with the treatment plan      THIS DOCUMENT WAS CREATED USING A [address] [place].   THERE IS A POSSIBILTY OF TRANSCRIBING ERRORS